# Patient Record
Sex: MALE | Race: WHITE | Employment: FULL TIME | URBAN - METROPOLITAN AREA
[De-identification: names, ages, dates, MRNs, and addresses within clinical notes are randomized per-mention and may not be internally consistent; named-entity substitution may affect disease eponyms.]

---

## 2018-02-27 ENCOUNTER — OFFICE VISIT (OUTPATIENT)
Dept: FAMILY MEDICINE CLINIC | Facility: CLINIC | Age: 23
End: 2018-02-27

## 2018-02-27 VITALS
WEIGHT: 283 LBS | DIASTOLIC BLOOD PRESSURE: 74 MMHG | SYSTOLIC BLOOD PRESSURE: 126 MMHG | BODY MASS INDEX: 40.52 KG/M2 | HEART RATE: 80 BPM | HEIGHT: 70 IN

## 2018-02-27 DIAGNOSIS — B07.8 OTHER VIRAL WARTS: Primary | ICD-10-CM

## 2018-02-27 PROCEDURE — 99203 OFFICE O/P NEW LOW 30 MIN: CPT | Performed by: FAMILY MEDICINE

## 2018-02-27 PROCEDURE — 17110 DESTRUCTION B9 LES UP TO 14: CPT | Performed by: FAMILY MEDICINE

## 2018-02-27 NOTE — PROGRESS NOTES
Jimi Coon is a 20year old male here for Patient presents with:  Warts: Wart removal on scalp      HPI:     Wart  -on right scalp  -had punch biopsy removal about 1-2 yrs ago  -since then, has slowly come back  -is smaller than what it was  -can feel Little Mullen MD

## 2018-02-27 NOTE — PATIENT INSTRUCTIONS
-- wart may blister up and fall off  -- if not, use otc liquid compound W (with gauze or cotton ball once daily for about 2 wks)  -- followup in 2 wks if not resolved

## 2019-03-08 ENCOUNTER — OFFICE VISIT (OUTPATIENT)
Dept: FAMILY MEDICINE CLINIC | Facility: CLINIC | Age: 24
End: 2019-03-08
Payer: COMMERCIAL

## 2019-03-08 VITALS
WEIGHT: 281 LBS | HEIGHT: 70.39 IN | TEMPERATURE: 99 F | HEART RATE: 96 BPM | BODY MASS INDEX: 39.78 KG/M2 | DIASTOLIC BLOOD PRESSURE: 80 MMHG | SYSTOLIC BLOOD PRESSURE: 114 MMHG

## 2019-03-08 DIAGNOSIS — A04.9 BACTERIAL ENTERITIS: Primary | ICD-10-CM

## 2019-03-08 DIAGNOSIS — J35.8 TONSILLAR MASS: ICD-10-CM

## 2019-03-08 PROCEDURE — 99214 OFFICE O/P EST MOD 30 MIN: CPT | Performed by: FAMILY MEDICINE

## 2019-03-09 PROBLEM — A04.9 BACTERIAL ENTERITIS: Status: ACTIVE | Noted: 2019-03-09

## 2019-03-09 PROBLEM — J35.8 TONSILLAR MASS: Status: ACTIVE | Noted: 2019-03-09

## 2019-10-16 ENCOUNTER — OFFICE VISIT (OUTPATIENT)
Dept: FAMILY MEDICINE CLINIC | Facility: CLINIC | Age: 24
End: 2019-10-16
Payer: COMMERCIAL

## 2019-10-16 VITALS
DIASTOLIC BLOOD PRESSURE: 60 MMHG | HEART RATE: 70 BPM | HEIGHT: 70.39 IN | TEMPERATURE: 99 F | WEIGHT: 273 LBS | BODY MASS INDEX: 38.65 KG/M2 | SYSTOLIC BLOOD PRESSURE: 102 MMHG | OXYGEN SATURATION: 98 %

## 2019-10-16 DIAGNOSIS — Z00.00 ROUTINE GENERAL MEDICAL EXAMINATION AT A HEALTH CARE FACILITY: Primary | ICD-10-CM

## 2019-10-16 DIAGNOSIS — H93.8X3 EAR CONGESTION, BILATERAL: ICD-10-CM

## 2019-10-16 PROCEDURE — 99214 OFFICE O/P EST MOD 30 MIN: CPT | Performed by: FAMILY MEDICINE

## 2019-10-16 PROCEDURE — 99395 PREV VISIT EST AGE 18-39: CPT | Performed by: FAMILY MEDICINE

## 2019-10-16 RX ORDER — FLUTICASONE PROPIONATE 50 MCG
2 SPRAY, SUSPENSION (ML) NASAL DAILY
Qty: 1 BOTTLE | Refills: 2 | Status: SHIPPED | OUTPATIENT
Start: 2019-10-16 | End: 2020-01-14

## 2019-10-16 NOTE — PATIENT INSTRUCTIONS
-- start flonase nasal spray as daily for 4-6 wks to encourage continued sinus drainage   -- generic claritin, zyrtec or allegra daily for 2-4 wks at least to help with any potential allergy component    -- followup if not improving despite above     --

## 2019-10-16 NOTE — PROGRESS NOTES
Irene Olea is a 21year old male who is here for Patient presents with: Well Adult  Earache: Bilateral ear pain x 2 weeks. No fevers      HPI:     1.  Routine general medical examination at a health care facility  -here for wellness    Bilateral ear visit.       REVIEW OF SYSTEMS:     GENERAL HEALTH: feels well otherwise.  No f/c  NEURO: denies any headaches, LH, dizzyness, LOC, falls  VISION: denies any blurred or double vision  RESPIRATORY: denies shortness of breath, cough, or congestion  CARDIOVASC gait, no appreciable defects  EXTREMITIES: no cyanosis, clubbing or edema  SKIN: no rashes,no suspicious lesions    Problem focused exam (for problems outside of physical, if any):  Bilateral air fluid levels in TMs without erythema    The ASCVD Risk score

## 2020-07-09 ENCOUNTER — TELEPHONE (OUTPATIENT)
Dept: FAMILY MEDICINE CLINIC | Facility: CLINIC | Age: 25
End: 2020-07-09

## 2020-07-09 NOTE — TELEPHONE ENCOUNTER
Ana Barnhart is calling because he was exposed to the Stephon virus at work, he was told to stay home and contact his Dr's office, he would like to see if he can get tested for Covid, he has no symptoms.  Please call Ana Barnhart at [phone]

## 2020-07-09 NOTE — TELEPHONE ENCOUNTER
LOV 10/16/19    Patient states he is asymptomatic but was advised by his employer to contact PCP. Patient has been advised to stay home for 14 days and he started his quarantine today.    He did get tested through his [facility] department and will chiara

## 2020-09-09 ENCOUNTER — TELEPHONE (OUTPATIENT)
Dept: FAMILY MEDICINE CLINIC | Facility: CLINIC | Age: 25
End: 2020-09-09

## 2020-09-09 DIAGNOSIS — Z20.822 SUSPECTED COVID-19 VIRUS INFECTION: Primary | ICD-10-CM

## 2020-09-09 NOTE — TELEPHONE ENCOUNTER
Spoke to patient. Stated Monday was eating sea food. Started having diarrhea Monday night and Tuesday. Tuesday also developed chills, headache, temperature (not sure how high) and having stomach cramps. He is thinking maybe food poisoning.   Today contin

## 2020-09-09 NOTE — TELEPHONE ENCOUNTER
COVID test ordered - should hopefully get scheduled for tomorrow - will call to f/u with patient as scheduled

## 2020-09-10 ENCOUNTER — VIRTUAL PHONE E/M (OUTPATIENT)
Dept: FAMILY MEDICINE CLINIC | Facility: CLINIC | Age: 25
End: 2020-09-10
Payer: COMMERCIAL

## 2020-09-10 ENCOUNTER — APPOINTMENT (OUTPATIENT)
Dept: LAB | Age: 25
End: 2020-09-10
Attending: FAMILY MEDICINE
Payer: COMMERCIAL

## 2020-09-10 DIAGNOSIS — R19.7 DIARRHEA, UNSPECIFIED TYPE: ICD-10-CM

## 2020-09-10 DIAGNOSIS — R68.83 CHILLS: Primary | ICD-10-CM

## 2020-09-10 DIAGNOSIS — Z20.822 SUSPECTED COVID-19 VIRUS INFECTION: ICD-10-CM

## 2020-09-10 DIAGNOSIS — R51.9 HEADACHE DISORDER: ICD-10-CM

## 2020-09-10 PROCEDURE — 99214 OFFICE O/P EST MOD 30 MIN: CPT | Performed by: FAMILY MEDICINE

## 2020-09-10 NOTE — PROGRESS NOTES
Virtual/Telephone Check-In    Dee Dee Mcknight is a 31year old male here today for a telemedicine audio only visit. Patient understands and accepts financial responsibility for any deductible, co-insurance and/or co-pays associated with this service. prior to today's visit):  No current outpatient medications on file.        Allergies:  No Known Allergies      ROS:     --See HPI for relevant ROS  --GEN: Denies  --HEENT: Denies  --RESP: Denies  --CV: Denies  --GI: Denies  --: Denies  --MSK: Denies  All

## 2020-09-11 ENCOUNTER — TELEPHONE (OUTPATIENT)
Dept: FAMILY MEDICINE CLINIC | Facility: CLINIC | Age: 25
End: 2020-09-11

## 2020-09-11 NOTE — TELEPHONE ENCOUNTER
We received forms from [facility] and they were placed on Karis's desk. I wasn't sure if the pt needed and appt or if Dr. Donna Guillen discussed this with him on his virtual visit yesterday.

## 2020-09-12 LAB — SARS-COV-2 RNA RESP QL NAA+PROBE: NOT DETECTED

## 2020-09-17 ENCOUNTER — TELEPHONE (OUTPATIENT)
Dept: FAMILY MEDICINE CLINIC | Facility: CLINIC | Age: 25
End: 2020-09-17

## 2020-12-16 ENCOUNTER — OFFICE VISIT (OUTPATIENT)
Dept: FAMILY MEDICINE CLINIC | Facility: CLINIC | Age: 25
End: 2020-12-16
Payer: COMMERCIAL

## 2020-12-16 VITALS
DIASTOLIC BLOOD PRESSURE: 70 MMHG | HEART RATE: 64 BPM | SYSTOLIC BLOOD PRESSURE: 110 MMHG | TEMPERATURE: 97 F | WEIGHT: 296 LBS | BODY MASS INDEX: 42.37 KG/M2 | HEIGHT: 70.2 IN | OXYGEN SATURATION: 98 %

## 2020-12-16 DIAGNOSIS — Z00.00 ROUTINE GENERAL MEDICAL EXAMINATION AT A HEALTH CARE FACILITY: Primary | ICD-10-CM

## 2020-12-16 DIAGNOSIS — L72.9 CYST OF SKIN: ICD-10-CM

## 2020-12-16 PROCEDURE — 99395 PREV VISIT EST AGE 18-39: CPT | Performed by: FAMILY MEDICINE

## 2020-12-16 PROCEDURE — 3074F SYST BP LT 130 MM HG: CPT | Performed by: FAMILY MEDICINE

## 2020-12-16 PROCEDURE — 99214 OFFICE O/P EST MOD 30 MIN: CPT | Performed by: FAMILY MEDICINE

## 2020-12-16 PROCEDURE — 3078F DIAST BP <80 MM HG: CPT | Performed by: FAMILY MEDICINE

## 2020-12-16 PROCEDURE — 3008F BODY MASS INDEX DOCD: CPT | Performed by: FAMILY MEDICINE

## 2020-12-16 NOTE — PROGRESS NOTES
Yumiko Resendez is a 31year old male who is here for Patient presents with:  Wellness Visit: Bump in back of left ear x 2 months      HPI:     1.  Routine general medical examination at a health care facility  -here for wellness    Cyst   -behind left ea Bacterial enteritis      No current outpatient medications on file prior to visit. No current facility-administered medications on file prior to visit. REVIEW OF SYSTEMS:     GENERAL HEALTH: feels well otherwise.  No f/c  NEURO: denies any headaches c/w/r  CARDIO: RRR without murmurs  GI: soft, non-tender, non-distended, no appreciable hsm, bs throughout  NEURO: CN II-XII grossly intact  PSYCH: pleasant  MUSCULOSKELETAL: normal gait, no appreciable defects  EXTREMITIES: no cyanosis, clubbing or edema

## 2021-02-08 ENCOUNTER — TELEMEDICINE (OUTPATIENT)
Dept: FAMILY MEDICINE CLINIC | Facility: CLINIC | Age: 26
End: 2021-02-08

## 2021-02-08 DIAGNOSIS — H92.02 LEFT EAR PAIN: Primary | ICD-10-CM

## 2021-02-08 DIAGNOSIS — R09.81 SINUS CONGESTION: ICD-10-CM

## 2021-02-08 DIAGNOSIS — R29.898 POPPING OF LEFT TEMPOROMANDIBULAR JOINT ON OPENING OF JAW: ICD-10-CM

## 2021-02-08 PROCEDURE — 99214 OFFICE O/P EST MOD 30 MIN: CPT | Performed by: FAMILY MEDICINE

## 2021-02-08 RX ORDER — FLUTICASONE PROPIONATE 50 MCG
2 SPRAY, SUSPENSION (ML) NASAL DAILY
Qty: 1 BOTTLE | Refills: 2 | Status: SHIPPED | OUTPATIENT
Start: 2021-02-08 | End: 2021-05-09

## 2021-02-09 NOTE — PROGRESS NOTES
Virtual/Telephone Check-In    David Holden is a 31year old male here today for a telemedicine audio and video visit. HPI:       1. Left ear pain  2. Sinus congestion  3.  Popping of left temporomandibular joint on opening of jaw  -comes and goes  - session: 1 or 2      Binge frequency: Less than monthly      Comment: social    Drug use: No         Medications (Active prior to today's visit):  Current Outpatient Medications   Medication Sig Dispense Refill   • Fluticasone Propionate 50 MCG/ACT Nasal S been spent reviewing labs, medications, radiology tests and decision making. Appropriate medical decision-making and tests are ordered as detailed in the plan of care above.   Coding/billing information is submitted for this visit based on complexity of car

## 2021-02-09 NOTE — PATIENT INSTRUCTIONS
-start daily antihistamine and flonase daily for 2-4 wks  -look up TMJ exercises and basic management online  -if not improving followup with dentist  -followup with me after that if needed

## 2022-02-28 NOTE — PROGRESS NOTES
Primo Holden is a 24year old male. HPI:   Patient is in for evaluation of diarrhea. States that he ate some cheese from [employer] last week and then had diarrhea 3 days straight.   States he was well for 2 days and then experienced diarrhea and naus exertion  CARDIOVASCULAR: denies chest pain on exertion  GI: See above   nEURO: denies headaches  Musculoskeletal: No motor deficits  EXAM:   /80 (BP Location: Left arm, Patient Position: Sitting, Cuff Size: adult)   Pulse 96   Temp 98.9 °F (37.2 °C) patient indicates understanding of these issues and agrees to the plan. The patient is asked to return in as needed follow-up if symptoms return. La Nena Altamirano No

## 2022-10-17 ENCOUNTER — OFFICE VISIT (OUTPATIENT)
Dept: FAMILY MEDICINE CLINIC | Facility: CLINIC | Age: 27
End: 2022-10-17
Payer: COMMERCIAL

## 2022-10-17 VITALS
TEMPERATURE: 97 F | HEART RATE: 84 BPM | DIASTOLIC BLOOD PRESSURE: 60 MMHG | SYSTOLIC BLOOD PRESSURE: 110 MMHG | HEIGHT: 70.28 IN | OXYGEN SATURATION: 97 % | BODY MASS INDEX: 43.18 KG/M2 | WEIGHT: 305 LBS

## 2022-10-17 DIAGNOSIS — Z13.228 SCREENING FOR ENDOCRINE, NUTRITIONAL, METABOLIC AND IMMUNITY DISORDER: ICD-10-CM

## 2022-10-17 DIAGNOSIS — Z13.21 SCREENING FOR ENDOCRINE, NUTRITIONAL, METABOLIC AND IMMUNITY DISORDER: ICD-10-CM

## 2022-10-17 DIAGNOSIS — Z13.0 SCREENING FOR ENDOCRINE, NUTRITIONAL, METABOLIC AND IMMUNITY DISORDER: ICD-10-CM

## 2022-10-17 DIAGNOSIS — Z13.29 SCREENING FOR ENDOCRINE, NUTRITIONAL, METABOLIC AND IMMUNITY DISORDER: ICD-10-CM

## 2022-10-17 DIAGNOSIS — Z00.00 ROUTINE GENERAL MEDICAL EXAMINATION AT A HEALTH CARE FACILITY: Primary | ICD-10-CM

## 2022-10-17 DIAGNOSIS — Z13.6 SCREENING FOR CARDIOVASCULAR CONDITION: ICD-10-CM

## 2022-10-17 DIAGNOSIS — R19.7 DIARRHEA, UNSPECIFIED TYPE: ICD-10-CM

## 2022-10-17 DIAGNOSIS — E66.01 CLASS 3 SEVERE OBESITY DUE TO EXCESS CALORIES WITHOUT SERIOUS COMORBIDITY WITH BODY MASS INDEX (BMI) OF 40.0 TO 44.9 IN ADULT (HCC): ICD-10-CM

## 2022-10-17 NOTE — PATIENT INSTRUCTIONS
Work on diet and exercise changes    Diarrhea should slowly improve over next week or two - if worsening, let me know    Followup with me in 3 months, sooner if needed    --------------------------      Please try to work on the following dietary changes:     1. Drink water with meals and throughout the day, cut down on soda and/or juice if consumed. 2.  Eat breakfast daily and focus on having protein/fiber with each meal, examples include: greek yogurt, cottage cheese, hard boiled egg, whole grain toast with peanut butter, oatmeal or overnight oats  3. Reduce carbohydrates which includes sugar items such as sweets as well as rice, pasta, potatoes and bread and make sure to choose whole grain options when having them. 4. Always look at labels - look at sugar, carb content mainly    Please download surinder MyFitness Pal or Wilma Jeffers! to monitor daily dietary intake for 2 weeks initially to get a sense of what you are eating and where calories are coming from. Additionally this will help to see your daily carbohydrate intake. When you set the surinder up chose 1-2 lbs/week as a goal. Do not count exercise as a subtraction from daily calories. Keeping a paper food journal is an option as well to remain accountable for your choices- this is the start to mindful eating! Continue or start exercising to help establish a routine. If not already exercising begin with 1 day and progress as able with long-term goal of 30 minutes 5 days a week at a minimum. Meditation daily can help manage and control stress. Chronic stress can make weight loss difficult. Exercising is one way to help with stress, but meditation using the CALM Surinder or another comparable alternative can be done in your home or place of work with little time commitment. This Surinder can also help work on behavior change and improve sleep. Check out [email or web address] org blog for continued daily support and education along this weight loss journey!        --------------------------------------------------------------------    If labs ordered:  -- schedule appt for fasting bloodwork anytime that you are able to (fast for 8-10 hours minimum, no food. Water is fine). -- go to [employer] or use [employer] to schedule [employer]  -- call [employer] or use website to schedule labs if your insurance prefers [facility] (Always confirm your preferred lab with your insurance, and let us know if you need labs ordered at a specific location)  -- we will call with results about 5-7 days after bloodwork is completed    Always verify coverage of any testing or specialist referral with your insurance    Work on healthy nutrition:  -focus on plant based, low-fat proteins  -limit fatty, red, or processed meats  -decrease carbohydrates (bread, rice, pasta, tortillas, sweets, sodas, juice, energy drinks)  -eat more fruits and veggies  -1/2 of every meal should be fruits/veggies; 1/4 should be protein, only 1/4 should be carbohydrates  -can work on decreasing portion sizes with each meal and drink plenty of water with each meal   -eat slowly - the brain can take up to 20min to realize stomach is full (easier to overeat when you eat fast)  -try to eat consistently throughout the day - can use healthy proteins or fiber rich foods for snacks in between meals (nuts, oatmeal, fruits, veggies)  -can you calorie tracking apps (myfitness pal or similar) to everything you eat for up to 2 wks to get a sense of what you are eating    Increase exercise:  -goal is 20-30min of continuous cardio (increased heart-rate and sweating) for 3-4 times per week  -work your way slowly up to this  -can focus on low impact exercises (elliptical, cycling, swimming) if you have joint pains with walking/jogging/running    For sleep:  -make sure room is dark and quiet  -no reading, tv, phone, tablets, computers in bed - these can activate the brain over time and associate being awake with being in the bed  -if you are not sleeping, leave the bedroom, do any of the above until you are tired, then try again  -this will help reinforce with the brain the the bed is for sleeping  -consider melatonin 5-6mg nightly for 4-6 wks to help with sleep  -can use OTC benadryl or unisom as needed on top of this    Skin health:  -always use sunscreen (30+ spf) if out in the sun for longer than 10-15min  -cover up if needed  -reapply sunscreen every 2 hours  -consider seeing a dermatologist for a full skin exam every 1-2 years if you have had a lot of sun exposure in your life or if you have a lot of moles

## 2023-02-08 ENCOUNTER — OFFICE VISIT (OUTPATIENT)
Dept: FAMILY MEDICINE CLINIC | Facility: CLINIC | Age: 28
End: 2023-02-08
Payer: COMMERCIAL

## 2023-02-08 VITALS
WEIGHT: 303 LBS | HEIGHT: 70.28 IN | HEART RATE: 80 BPM | TEMPERATURE: 98 F | SYSTOLIC BLOOD PRESSURE: 100 MMHG | DIASTOLIC BLOOD PRESSURE: 60 MMHG | OXYGEN SATURATION: 97 % | BODY MASS INDEX: 42.9 KG/M2

## 2023-02-08 DIAGNOSIS — Z13.29 SCREENING FOR ENDOCRINE, METABOLIC AND IMMUNITY DISORDER: ICD-10-CM

## 2023-02-08 DIAGNOSIS — E66.01 CLASS 3 SEVERE OBESITY DUE TO EXCESS CALORIES WITHOUT SERIOUS COMORBIDITY WITH BODY MASS INDEX (BMI) OF 40.0 TO 44.9 IN ADULT (HCC): Primary | ICD-10-CM

## 2023-02-08 DIAGNOSIS — Z13.0 SCREENING FOR ENDOCRINE, METABOLIC AND IMMUNITY DISORDER: ICD-10-CM

## 2023-02-08 DIAGNOSIS — L72.9 CYST OF SKIN: ICD-10-CM

## 2023-02-08 DIAGNOSIS — J02.9 SORE THROAT: ICD-10-CM

## 2023-02-08 DIAGNOSIS — Z13.228 SCREENING FOR ENDOCRINE, METABOLIC AND IMMUNITY DISORDER: ICD-10-CM

## 2023-02-08 PROCEDURE — 3008F BODY MASS INDEX DOCD: CPT | Performed by: FAMILY MEDICINE

## 2023-02-08 PROCEDURE — 99214 OFFICE O/P EST MOD 30 MIN: CPT | Performed by: FAMILY MEDICINE

## 2023-02-08 PROCEDURE — 3074F SYST BP LT 130 MM HG: CPT | Performed by: FAMILY MEDICINE

## 2023-02-08 PROCEDURE — 3078F DIAST BP <80 MM HG: CPT | Performed by: FAMILY MEDICINE

## 2023-02-08 RX ORDER — FLUTICASONE PROPIONATE 50 MCG
2 SPRAY, SUSPENSION (ML) NASAL DAILY
Qty: 16 G | Refills: 1 | Status: SHIPPED | OUTPATIENT
Start: 2023-02-08 | End: 2023-05-09

## 2023-02-08 NOTE — PATIENT INSTRUCTIONS
Continue with diet and exercise changes    Go to [facility] for fasting bloodwork when able     You can decide if you want to go to [facility] in [place] or when you get back    We will start metformin once labs are back    Followup in 4-6 wks, sooner if needed

## 2023-02-14 LAB
ABSOLUTE BASOPHILS: 50 CELLS/UL (ref 0–200)
ABSOLUTE EOSINOPHILS: 227 CELLS/UL (ref 15–500)
ABSOLUTE LYMPHOCYTES: 2982 CELLS/UL (ref 850–3900)
ABSOLUTE MONOCYTES: 511 CELLS/UL (ref 200–950)
ABSOLUTE NEUTROPHILS: 3330 CELLS/UL (ref 1500–7800)
ALBUMIN/GLOBULIN RATIO: 1.6 (CALC) (ref 1–2.5)
ALBUMIN: 4.2 G/DL (ref 3.6–5.1)
ALKALINE PHOSPHATASE: 81 U/L (ref 36–130)
ALT: 27 U/L (ref 9–46)
AST: 20 U/L (ref 10–40)
BASOPHILS: 0.7 %
BILIRUBIN, TOTAL: 0.6 MG/DL (ref 0.2–1.2)
BUN: 9 MG/DL (ref 7–25)
CALCIUM: 9.5 MG/DL (ref 8.6–10.3)
CARBON DIOXIDE: 26 MMOL/L (ref 20–32)
CHLORIDE: 104 MMOL/L (ref 98–110)
CHOL/HDLC RATIO: 4.8 (CALC)
CHOLESTEROL, TOTAL: 159 MG/DL
CREATININE: 0.85 MG/DL (ref 0.6–1.24)
EGFR: 121 ML/MIN/1.73M2
EOSINOPHILS: 3.2 %
GLOBULIN: 2.7 G/DL (CALC) (ref 1.9–3.7)
GLUCOSE: 92 MG/DL (ref 65–99)
HDL CHOLESTEROL: 33 MG/DL
HEMATOCRIT: 48.5 % (ref 38.5–50)
HEMOGLOBIN: 15.9 G/DL (ref 13.2–17.1)
LDL-CHOLESTEROL: 84 MG/DL (CALC)
LYMPHOCYTES: 42 %
MCH: 27.6 PG (ref 27–33)
MCHC: 32.8 G/DL (ref 32–36)
MCV: 84.1 FL (ref 80–100)
MONOCYTES: 7.2 %
MPV: 14.1 FL (ref 7.5–12.5)
NEUTROPHILS: 46.9 %
NON-HDL CHOLESTEROL: 126 MG/DL (CALC)
PLATELET COUNT: 439 THOUSAND/UL (ref 140–400)
POTASSIUM: 4.3 MMOL/L (ref 3.5–5.3)
PROTEIN, TOTAL: 6.9 G/DL (ref 6.1–8.1)
RDW: 14.2 % (ref 11–15)
RED BLOOD CELL COUNT: 5.77 MILLION/UL (ref 4.2–5.8)
SODIUM: 139 MMOL/L (ref 135–146)
TRIGLYCERIDES: 349 MG/DL
TSH W/REFLEX TO FT4: 1.78 MIU/L (ref 0.4–4.5)
VITAMIN D, 25-OH, TOTAL: 14 NG/ML (ref 30–100)
WHITE BLOOD CELL COUNT: 7.1 THOUSAND/UL (ref 3.8–10.8)

## 2023-02-15 ENCOUNTER — PATIENT MESSAGE (OUTPATIENT)
Dept: FAMILY MEDICINE CLINIC | Facility: CLINIC | Age: 28
End: 2023-02-15

## 2023-02-15 NOTE — TELEPHONE ENCOUNTER
From: Shane Ruiz  To: Huseyin Khoury MD  Sent: 2/15/2023 8:50 AM CST  Subject: Question regarding LIPID PANEL    Not sure if it was noted, but I did not fast the 8 hours prior to the test. This may have influenced the triglyceride levels.

## 2023-02-19 RX ORDER — ERGOCALCIFEROL 1.25 MG/1
50000 CAPSULE ORAL WEEKLY
Qty: 12 CAPSULE | Refills: 0 | Status: SHIPPED | OUTPATIENT
Start: 2023-02-19 | End: 2023-03-21

## 2023-02-21 RX ORDER — METFORMIN HYDROCHLORIDE 500 MG/1
TABLET, EXTENDED RELEASE ORAL
Qty: 180 TABLET | Refills: 1 | Status: SHIPPED | OUTPATIENT
Start: 2023-02-21 | End: 2023-06-07

## 2023-05-23 ENCOUNTER — OFFICE VISIT (OUTPATIENT)
Dept: FAMILY MEDICINE CLINIC | Facility: CLINIC | Age: 28
End: 2023-05-23
Payer: COMMERCIAL

## 2023-05-23 VITALS
BODY MASS INDEX: 43.23 KG/M2 | WEIGHT: 302 LBS | TEMPERATURE: 98 F | DIASTOLIC BLOOD PRESSURE: 60 MMHG | HEART RATE: 84 BPM | HEIGHT: 70.2 IN | SYSTOLIC BLOOD PRESSURE: 102 MMHG | OXYGEN SATURATION: 97 %

## 2023-05-23 DIAGNOSIS — M25.521 RIGHT ELBOW PAIN: ICD-10-CM

## 2023-05-23 DIAGNOSIS — E66.01 CLASS 3 SEVERE OBESITY DUE TO EXCESS CALORIES WITHOUT SERIOUS COMORBIDITY WITH BODY MASS INDEX (BMI) OF 40.0 TO 44.9 IN ADULT (HCC): Primary | ICD-10-CM

## 2023-05-23 PROCEDURE — 3078F DIAST BP <80 MM HG: CPT | Performed by: FAMILY MEDICINE

## 2023-05-23 PROCEDURE — 3008F BODY MASS INDEX DOCD: CPT | Performed by: FAMILY MEDICINE

## 2023-05-23 PROCEDURE — 3074F SYST BP LT 130 MM HG: CPT | Performed by: FAMILY MEDICINE

## 2023-05-23 PROCEDURE — 99214 OFFICE O/P EST MOD 30 MIN: CPT | Performed by: FAMILY MEDICINE

## 2023-05-23 NOTE — PATIENT INSTRUCTIONS
Start OTC vit D 2000 units once daily    Restart metformin 1 tab daily in AM with food for at least a couple weeks   If symptoms don't come back and your feel well, can increase 2 tabs every AM with food    Continue exercise and cardio    Limit repetitive and heavy weights on right elbow  Start gentle stretching    Followup in 3 months

## 2023-05-25 ENCOUNTER — PATIENT MESSAGE (OUTPATIENT)
Dept: FAMILY MEDICINE CLINIC | Facility: CLINIC | Age: 28
End: 2023-05-25

## 2023-05-25 NOTE — TELEPHONE ENCOUNTER
From: Ananya Cazares  To: Zena Burciaga MD  Sent: 5/25/2023 5:55 AM CDT  Subject: Metformin    Hello Dr. Kay Vance,    I am stopping the metformin again. I have a few symptoms arise. .now that I have used it for 2 days. A dry cough, back pain, hoarseness. .slight weezing, and some small discomfort in my chest area.  It just doesn't seem like my body wants this pharmaceutical.    Thanks,   Ananya Cazares

## 2023-07-23 RX ORDER — PHENTERMINE HYDROCHLORIDE 15 MG/1
15 CAPSULE ORAL EVERY MORNING
Qty: 30 CAPSULE | Refills: 0 | Status: SHIPPED | OUTPATIENT
Start: 2023-07-23

## 2023-08-23 ENCOUNTER — TELEMEDICINE (OUTPATIENT)
Dept: FAMILY MEDICINE CLINIC | Facility: CLINIC | Age: 28
End: 2023-08-23
Payer: COMMERCIAL

## 2023-08-23 DIAGNOSIS — M25.521 RIGHT ELBOW PAIN: ICD-10-CM

## 2023-08-23 DIAGNOSIS — E66.01 CLASS 3 SEVERE OBESITY DUE TO EXCESS CALORIES WITHOUT SERIOUS COMORBIDITY WITH BODY MASS INDEX (BMI) OF 40.0 TO 44.9 IN ADULT (HCC): Primary | ICD-10-CM

## 2023-08-23 PROCEDURE — 99214 OFFICE O/P EST MOD 30 MIN: CPT | Performed by: FAMILY MEDICINE

## 2023-08-23 RX ORDER — PHENTERMINE HYDROCHLORIDE 15 MG/1
15 CAPSULE ORAL EVERY MORNING
Qty: 30 CAPSULE | Refills: 0 | Status: SHIPPED | OUTPATIENT
Start: 2023-08-23

## 2023-08-23 NOTE — PROGRESS NOTES
Virtual/Telephone Check-In    César Bee is a 33year old male here today for a telemedicine audio and video visit. HPI:       1. Class 3 severe obesity due to excess calories without serious comorbidity with body mass index (BMI) of 40.0 to 44.9 in [facility])  -doing very well with phentermine 15  -notes lower appetite  -eating healthier  -lost 21 lbs  -feels like appetite may be increasing a little    2. Right elbow pain  -about the same, no better, no worse  -not limiting exercise      ASSESSMENT/PLAN:     1. Class 3 severe obesity due to excess calories without serious comorbidity with body mass index (BMI) of 40.0 to 44.9 in [facility])  -doing very well  -will continue 15mg dose for now  -if weight or appetite start increasing futher, he will let us know - and we will increase to 30mg daily  -otherwise, f/u in 2 months for recheck    2. Right elbow pain  -stable, will continue to monitor  -limit repetitive use  -f/u in 2 months  -he will touch base if worse        Orders This Visit:  No orders of the defined types were placed in this encounter. Meds This Visit:  Requested Prescriptions     Signed Prescriptions Disp Refills    Phentermine HCl 15 MG Oral Cap 30 capsule 0     Sig: Take 1 capsule (15 mg total) by mouth every morning. Imaging & Referrals:  None       PHYSICAL EXAM:     Patient Reported Vitals             Patient Reported Weight: 281 lb (127.5 kg)             Gen: NAD, alert and oriented x 3, able to speak in full sentences  Pulm: No labored breathing or appreciable shortness of breath, no coughing during duration of visit  Psych: normal affect      HISTORY:       Past Medical History:   Diagnosis Date    Allergic rhinitis 09/02/22    Obesity     Im assuming my size would be considered obese. History reviewed. No pertinent surgical history.    Family History   Problem Relation Age of Onset    Hypertension Father     Diabetes Maternal Grandmother         Aunt (Efren Flies) also has diabetes. Hypertension Maternal Grandmother     Hypertension Maternal Grandfather     Arthritis Maternal Grandfather     Obesity Sister     Obesity Brother       Social History:   Social History     Socioeconomic History    Marital status: Single   Tobacco Use    Smoking status: Never    Smokeless tobacco: Never   Vaping Use    Vaping Use: Never used   Substance and Sexual Activity    Alcohol use: Yes     Alcohol/week: 1.0 standard drink of alcohol     Types: 1 Cans of beer per week     Comment: 2 drinks every other weekend    Drug use: No   Other Topics Concern    Caffeine Concern Yes     Comment: 1 cup of coffee every 3 days    Exercise Yes     Comment: 2-3 x weekly    Seat Belt Yes          Medications (Active prior to today's visit):  Current Outpatient Medications   Medication Sig Dispense Refill    Phentermine HCl 15 MG Oral Cap Take 1 capsule (15 mg total) by mouth every morning. 30 capsule 0       Allergies:  No Known Allergies      ROS:   --See HPI for relevant ROS    --GEN: No other complaints  --HEENT: No other complaints  --RESP: No other complaints  --CV: No other complaints  --GI: No other complaints  --: No other complaints  --MSK: No other complaints  All other systems reviewed are negative      Martin Zacarias MD     Telehealth outside of [address] Verbal Consent   I conducted a telehealth visit with Macarena Cordon today, 08/23/23, which was completed using two-way, real-time interactive audio and video communication. This has been done in good malachi to provide continuity of care in the best interest of the provider-patient relationship, due to the COVID -19 public health crisis/national emergency where restrictions of face-to-face office visits are ongoing. Every conscious effort was taken to allow for sufficient and adequate time to complete the visit.   The patient was made aware of the limitations of the telehealth visit, including treatment limitations as no physical exam could be performed. The patient was advised to call 911 or to go to the ER in case there was an emergency. The patient was also advised of the potential privacy & security concerns related to the telehealth platform. The patient was made aware of where to find [facility] notice of privacy practices, telehealth consent form and other related consent forms and documents. which are located on the [facility] website. The patient verbally agreed to telehealth consent form, related consents and the risks discussed. Lastly, the patient confirmed that they were in [place]. Included in this visit, time may have been spent reviewing labs, medications, radiology tests and decision making. Appropriate medical decision-making and tests are ordered as detailed in the plan of care above. Coding/billing information is submitted for this visit based on complexity of care and/or time spent for the visit. This visit is conducted using Telemedicine with live, interactive video and audio. Patient has been referred to the [facility] website at [email or web address] to review the yearly Consent to Treat document. Patient understands and accepts financial responsibility for any deductible, co-insurance and/or co-pays associated with this service.         Duration of the service: 30 min

## 2023-09-27 NOTE — TELEPHONE ENCOUNTER
Medication(s) to Refill:   Requested Prescriptions     Pending Prescriptions Disp Refills    Phentermine HCl 15 MG Oral Cap 30 capsule 0     Sig: Take 1 capsule (15 mg total) by mouth every morning.      Last Time Medication was Filled:  8/23/23    Recent Visits  Date Type Provider Dept   05/23/23 Office Visit Jacquelin Yen MD [facility]     Future Appointments  Date Type Provider Dept   11/06/23 Appointment Jacquelin Yen MD [facility]

## 2023-09-28 RX ORDER — PHENTERMINE HYDROCHLORIDE 15 MG/1
15 CAPSULE ORAL EVERY MORNING
Qty: 30 CAPSULE | Refills: 0 | Status: SHIPPED | OUTPATIENT
Start: 2023-09-28

## 2023-11-06 ENCOUNTER — OFFICE VISIT (OUTPATIENT)
Dept: FAMILY MEDICINE CLINIC | Facility: CLINIC | Age: 28
End: 2023-11-06
Payer: COMMERCIAL

## 2023-11-06 VITALS
TEMPERATURE: 98 F | HEART RATE: 102 BPM | DIASTOLIC BLOOD PRESSURE: 70 MMHG | HEIGHT: 70.28 IN | WEIGHT: 281 LBS | SYSTOLIC BLOOD PRESSURE: 104 MMHG | OXYGEN SATURATION: 97 % | BODY MASS INDEX: 39.78 KG/M2

## 2023-11-06 DIAGNOSIS — M25.572 ACUTE LEFT ANKLE PAIN: ICD-10-CM

## 2023-11-06 DIAGNOSIS — E66.01 CLASS 3 SEVERE OBESITY DUE TO EXCESS CALORIES WITHOUT SERIOUS COMORBIDITY WITH BODY MASS INDEX (BMI) OF 40.0 TO 44.9 IN ADULT (HCC): ICD-10-CM

## 2023-11-06 DIAGNOSIS — L73.9 FOLLICULITIS: ICD-10-CM

## 2023-11-06 DIAGNOSIS — Z00.00 ROUTINE GENERAL MEDICAL EXAMINATION AT A HEALTH CARE FACILITY: Primary | ICD-10-CM

## 2023-11-06 PROCEDURE — 99395 PREV VISIT EST AGE 18-39: CPT | Performed by: FAMILY MEDICINE

## 2023-11-06 PROCEDURE — 3074F SYST BP LT 130 MM HG: CPT | Performed by: FAMILY MEDICINE

## 2023-11-06 PROCEDURE — 99214 OFFICE O/P EST MOD 30 MIN: CPT | Performed by: FAMILY MEDICINE

## 2023-11-06 PROCEDURE — 3008F BODY MASS INDEX DOCD: CPT | Performed by: FAMILY MEDICINE

## 2023-11-06 PROCEDURE — 3078F DIAST BP <80 MM HG: CPT | Performed by: FAMILY MEDICINE

## 2023-11-06 RX ORDER — MUPIROCIN CALCIUM 20 MG/G
1 CREAM TOPICAL DAILY
Qty: 30 G | Refills: 1 | Status: SHIPPED | OUTPATIENT
Start: 2023-11-06 | End: 2023-11-20

## 2023-11-06 RX ORDER — PHENTERMINE HYDROCHLORIDE 15 MG/1
15 CAPSULE ORAL EVERY MORNING
Qty: 30 CAPSULE | Refills: 0 | Status: SHIPPED | OUTPATIENT
Start: 2023-11-06

## 2023-11-07 NOTE — PATIENT INSTRUCTIONS
Continue phentermine as prescribed    Continue to work on lifestyle changes    Start mupirocin ointment on back of neck     Aleve 2 tabs 2x/day with food, for next 4-5 days, then only as needed    Ice, ace wrap for compression    Would expect slow continued improvement    Activity as tolerated but do not overdo    Go to [facility] for labs prior to next appt    Followup in 3 months

## 2023-11-08 ENCOUNTER — TELEPHONE (OUTPATIENT)
Dept: FAMILY MEDICINE CLINIC | Facility: CLINIC | Age: 28
End: 2023-11-08

## 2023-11-08 NOTE — TELEPHONE ENCOUNTER
Your PA request has been denied. Additional information will be provided in the denial communication. (Message [phone])   Case ID: [id number]      Payer: [facility]    [phone]   Electronic appeal: Not supported   Your PA request has been denied. Additional information will be provided in the denial communication.  (Message [id number])       mupirocin 2 % External Cream   pt can utilize [facility] $6.82 first fill

## 2025-07-01 ENCOUNTER — OFFICE VISIT (OUTPATIENT)
Dept: FAMILY MEDICINE CLINIC | Facility: CLINIC | Age: 30
End: 2025-07-01
Payer: COMMERCIAL

## 2025-07-01 VITALS
WEIGHT: 309.19 LBS | TEMPERATURE: 98 F | HEIGHT: 70 IN | RESPIRATION RATE: 18 BRPM | DIASTOLIC BLOOD PRESSURE: 74 MMHG | BODY MASS INDEX: 44.27 KG/M2 | HEART RATE: 90 BPM | OXYGEN SATURATION: 96 % | SYSTOLIC BLOOD PRESSURE: 122 MMHG

## 2025-07-01 DIAGNOSIS — E66.813 CLASS 3 SEVERE OBESITY DUE TO EXCESS CALORIES WITHOUT SERIOUS COMORBIDITY WITH BODY MASS INDEX (BMI) OF 40.0 TO 44.9 IN ADULT: ICD-10-CM

## 2025-07-01 DIAGNOSIS — Z00.00 ROUTINE GENERAL MEDICAL EXAMINATION AT A HEALTH CARE FACILITY: Primary | ICD-10-CM

## 2025-07-01 DIAGNOSIS — E55.9 VITAMIN D DEFICIENCY: ICD-10-CM

## 2025-07-01 DIAGNOSIS — R09.81 SINUS CONGESTION: ICD-10-CM

## 2025-07-01 PROCEDURE — 99214 OFFICE O/P EST MOD 30 MIN: CPT | Performed by: FAMILY MEDICINE

## 2025-07-01 PROCEDURE — 99395 PREV VISIT EST AGE 18-39: CPT | Performed by: FAMILY MEDICINE

## 2025-07-01 RX ORDER — ERYTHROMYCIN 5 MG/G
OINTMENT OPHTHALMIC
Qty: 1 G | Refills: 0 | Status: SHIPPED | OUTPATIENT
Start: 2025-07-01

## 2025-07-01 RX ORDER — METHYLPREDNISOLONE 4 MG/1
TABLET ORAL
Qty: 21 EACH | Refills: 0 | Status: SHIPPED | OUTPATIENT
Start: 2025-07-01

## 2025-07-01 RX ORDER — AMOXICILLIN 875 MG/1
875 TABLET, COATED ORAL 2 TIMES DAILY
COMMUNITY
Start: 2025-06-27

## 2025-07-01 RX ORDER — FLUTICASONE PROPIONATE 50 MCG
2 SPRAY, SUSPENSION (ML) NASAL DAILY
Qty: 16 G | Refills: 2 | Status: SHIPPED | OUTPATIENT
Start: 2025-07-01 | End: 2025-09-29

## 2025-07-01 NOTE — PATIENT INSTRUCTIONS
Consider coQ 10 supplement daily    Continue with lifestyle changes    Go to lab for fasting bloodwork    Start flonase nasal spray nightly    Start otc generic claritin or zyrtec daily    Warm compress to eyes 2-3 x/day    Steroid pack if not improving    Or eye ointment if not improving    Followup in 3 months

## 2025-07-01 NOTE — PROGRESS NOTES
Shamar Calderon is a 30 year old male who is here for   Chief Complaint   Patient presents with    Sore Throat     Dry cough nasal congestion headaches     Wellness Visit     Reviewed Preventative/Wellness form with patient.       HPI:     1. Routine general medical examination at a health care facility  -due for faheem    2. Class 3 severe obesity due to excess calories without serious comorbidity with body mass index (BMI) of 40.0 to 44.9 in adult (HCC)  Vit D deficiency  -phentermine caused side effects so stopped it  -trying to watch diet  -no issues  -continues to work on lifestyle changes    3. Sinus congestion  -started 1 wk ago  -associated with dry cough, sore throat, sinus congestion  -now with increased sinus congestion  -started on amoxicillin about 1 wk ago from a telehealth visit        Screening:  Diet: doing better  Exercise: tries to exercise   Sleep: normal  Depression/Anxiety: none    Testicular CA - counseled  Prostate CA - no significant fam hx  Colon CA - no significant fam hx    Works as a microbiologist -  for [employer]    Lung CA in great uncle     last month    Pertinent Fam Hx:    Family History   Problem Relation Age of Onset    Hypertension Father     Diabetes Maternal Grandmother         Aunt (Suzette Bateman) also has diabetes.    Hypertension Maternal Grandmother     Hypertension Maternal Grandfather         Gout, Hypertension, Liver Issue    Arthritis Maternal Grandfather     Obesity Sister     Obesity Brother        Social History     Socioeconomic History    Marital status: Single   Tobacco Use    Smoking status: Never    Smokeless tobacco: Never   Vaping Use    Vaping status: Never Used   Substance and Sexual Activity    Alcohol use: Yes     Alcohol/week: 1.0 standard drink of alcohol     Types: 1 Cans of beer per week     Comment: occ    Drug use: No   Other Topics Concern    Caffeine Concern Yes     Comment: 1 cup of coffee weekly    Stress Concern  No    Weight Concern No    Special Diet No    Exercise No     Comment: on hold    Seat Belt Yes       Wt Readings from Last 6 Encounters:   07/01/25 (!) 309 lb 3.2 oz (140.3 kg)   11/06/23 281 lb (127.5 kg)   05/23/23 (!) 302 lb (137 kg)   02/08/23 (!) 303 lb (137.4 kg)   10/17/22 (!) 305 lb (138.3 kg)   12/16/20 296 lb (134.3 kg)       Patient Active Problem List   Diagnosis    Tonsillar mass    Bacterial enteritis       Current Outpatient Medications on File Prior to Visit   Medication Sig Dispense Refill    amoxicillin 875 MG Oral Tab Take 1 tablet (875 mg total) by mouth 2 (two) times daily.       No current facility-administered medications on file prior to visit.       REVIEW OF SYSTEMS:     GENERAL HEALTH: feels well otherwise. No f/c  NEURO: denies any headaches, LH, dizzyness, LOC, falls  VISION: denies any blurred or double vision  RESPIRATORY: denies shortness of breath, cough, or congestion  CARDIOVASCULAR: denies chest pain, pressure or palpitations  GI: denies abdominal pain, constipation, diarrhea, n/v, BRBPR, melena  : no dysuria, frequency, or hematuria  SKIN: denies any unusual skin lesions or rashes  PSYCH: mood is stable  EXT: denies edema     Wt Readings from Last 6 Encounters:   07/01/25 (!) 309 lb 3.2 oz (140.3 kg)   11/06/23 281 lb (127.5 kg)   05/23/23 (!) 302 lb (137 kg)   02/08/23 (!) 303 lb (137.4 kg)   10/17/22 (!) 305 lb (138.3 kg)   12/16/20 296 lb (134.3 kg)       No Known Allergies    Patient Active Problem List   Diagnosis    Tonsillar mass    Bacterial enteritis       Family History   Problem Relation Age of Onset    Hypertension Father     Diabetes Maternal Grandmother         Aunt (Suzette Bateman) also has diabetes.    Hypertension Maternal Grandmother     Hypertension Maternal Grandfather         Gout, Hypertension, Liver Issue    Arthritis Maternal Grandfather     Obesity Sister     Obesity Brother       Past Medical History:    Allergic rhinitis    Obesity    Im assuming my  size would be considered obese.      History reviewed. No pertinent surgical history.   Social History:    Social History     Socioeconomic History    Marital status: Single   Tobacco Use    Smoking status: Never    Smokeless tobacco: Never   Vaping Use    Vaping status: Never Used   Substance and Sexual Activity    Alcohol use: Yes     Alcohol/week: 1.0 standard drink of alcohol     Types: 1 Cans of beer per week     Comment: occ    Drug use: No   Other Topics Concern    Caffeine Concern Yes     Comment: 1 cup of coffee weekly    Stress Concern No    Weight Concern No    Special Diet No    Exercise No     Comment: on hold    Seat Belt Yes           EXAM:   /74 (BP Location: Left arm, Patient Position: Sitting, Cuff Size: adult)   Pulse 90   Temp 98 °F (36.7 °C) (Temporal)   Resp 18   Ht 5' 10\" (1.778 m)   Wt (!) 309 lb 3.2 oz (140.3 kg)   SpO2 96%   BMI 44.37 kg/m²     GENERAL: A&O, in no apparent distress  HEENT: atraumatic, MMM, throat is clear  EYES: PERRLA, EOMI  NECK: supple, no thyromegaly  CHEST: no tenderness  LUNGS: clear to auscultation bilateraly, no c/w/r  CARDIO: RRR without murmurs  GI: soft, non-tender, non-distended, no appreciable hsm, bs throughout  NEURO: CN II-XII grossly intact  PSYCH: pleasant  MUSCULOSKELETAL: normal gait, no appreciable defects  EXTREMITIES: no cyanosis, clubbing or edema  SKIN: no rashes,no suspicious lesions    Problem focused exam (for problems outside of physical, if any):  Follicular rash at base of scalp    The ASCVD Risk score (Hardik DK, et al., 2019) failed to calculate for the following reasons:    The 2019 ASCVD risk score is only valid for ages 40 to 79    ASSESSMENT AND PLAN:     Health Maintenance  -We discussed the following:  Healthy diet and exercise, immunizations, and cancer screening    -Fasting labs ordered    Stress Management: counseled    1. Routine general medical examination at a health care facility  -reviewed age appropriate  screening    2. Class 3 severe obesity due to excess calories without serious comorbidity with body mass index (BMI) of 40.0 to 44.9 in adult ([facility])  -c/w lifestyle changes  -c/w phentermine 15mg daily  -f/u 3 months    3. Sinus congestion  -with likely conjunctivitis   -sent in erythromycin ointment  -complete   -start medrol if congestion not improving    Orders This Visit:  Orders Placed This Encounter   Procedures    CBC With Differential With Platelet    Comp Metabolic Panel (14)    Lipid Panel    TSH W Reflex To Free T4    VITAMIN D, 25-HYDROXY [[id number]][Q]       Meds This Visit:  Requested Prescriptions     Signed Prescriptions Disp Refills    fluticasone propionate 50 MCG/ACT Nasal Suspension 16 g 2     Si sprays by Each Nare route daily.    methylPREDNISolone (MEDROL) 4 MG Oral Tablet Therapy Pack 21 each 0     Sig: As directed.    erythromycin 5 MG/GM Ophthalmic Ointment 1 g 0     Sig: Apply 0.5 inch to lower eyelid of affected eye 3-4x/day for 5-7 days       Imaging & Referrals:  None     The patient indicates understanding of these issues and agrees to the plan.  The patient is asked to return in prn.  ADRIA NELSON MD    I spent a total of 30 minutes, more than half of which was spent counseling/coordinating care regarding obesity, folliculitis, ankle pain(outside of time for wellness)

## (undated) NOTE — LETTER
Date: 3/8/2019    Patient Name: Rani Tenorio          To Whom it may concern: The above patient was seen at the [facility] for treatment of a medical condition. The patient may return to work on Monday March 11, 2019.         Quinn Álvarez

## (undated) NOTE — LETTER
11/15/19        [address] [facility]  [address] [id number]      Dear Lidia Shickshinny,    [facility] records indicate that you have outstanding lab work and or testing that was ordered for you and has not yet been completed:        CBC W Differential W

## (undated) NOTE — LETTER
Date: 11/6/2023    Patient Name: Rafi Floyd          To Whom it may concern: This letter has been written at the patient's request. The above patient was seen at the [facility] for treatment of a medical condition. This patient should be excused from attending work/school on 11/7/23. The patient may return to work/school on 11/8/23 with the following limitations - none.         Sincerely,    Collette Caul, MD

## (undated) NOTE — LETTER
Date: 9/10/2020    Patient Name: Radha Miguel          To Whom it may concern: The above patient was seen at the [facility] for treatment of a medical condition.     This patient should be excused from attending work/school from 9/8/20 t